# Patient Record
Sex: MALE | Race: WHITE | NOT HISPANIC OR LATINO
[De-identification: names, ages, dates, MRNs, and addresses within clinical notes are randomized per-mention and may not be internally consistent; named-entity substitution may affect disease eponyms.]

---

## 2023-08-17 ENCOUNTER — APPOINTMENT (OUTPATIENT)
Dept: ORTHOPEDIC SURGERY | Facility: CLINIC | Age: 23
End: 2023-08-17
Payer: COMMERCIAL

## 2023-08-17 VITALS — HEIGHT: 68 IN | BODY MASS INDEX: 18.94 KG/M2 | WEIGHT: 125 LBS

## 2023-08-17 DIAGNOSIS — M25.562 PAIN IN LEFT KNEE: ICD-10-CM

## 2023-08-17 PROBLEM — Z00.00 ENCOUNTER FOR PREVENTIVE HEALTH EXAMINATION: Status: ACTIVE | Noted: 2023-08-17

## 2023-08-17 PROCEDURE — 99203 OFFICE O/P NEW LOW 30 MIN: CPT

## 2023-08-17 PROCEDURE — 73562 X-RAY EXAM OF KNEE 3: CPT | Mod: LT

## 2023-08-17 NOTE — IMAGING
[de-identified] : On examination of the left knee no obvious swelling, no knee effusion, no ecchymosis, no erythema.  Skin is intact.  He has tenderness mainly along the medial and lateral patella facet.  No significant joint line tenderness, no tenderness along the medial or lateral collateral ligament.  No tenderness over the patella tendon or bursa tendon.  Mild tenderness noted along the anterior proximal tibia.  Calf is soft.  He felt stable to stress testing.  Ambulating well.  X-ray left knee in the office today no obvious fracture dislocation or bony abnormality.

## 2023-08-17 NOTE — ASSESSMENT
[FreeTextEntry1] : At this time they are going to try and get an official report for his left knee MRI for further review.  If they are unable to, we may have to put in a request for new left knee MRI.  He will continue with his conservative treatment including rest, icing, knee bracing, taping and home exercise.  Follow-up in our sports medicine department.

## 2023-08-17 NOTE — HISTORY OF PRESENT ILLNESS
[de-identified] : 23-year-old male comes in today with his mom for evaluation of his left knee pain that has been going on now since last September.  Patient states that he was in [place] he was exercising running, doing sprints and began to feel pain in the left knee.  He has seen several physicians in [place], was doing physical therapy hydrotherapy e-stim and still having pain.  He is using a knee brace.  He had an MRI in [place] in May, was told that he had plica syndrome and bone contusion possible stress fracture of the tibia.  [place] in today for further evaluation.

## 2023-09-05 ENCOUNTER — APPOINTMENT (OUTPATIENT)
Dept: ORTHOPEDIC SURGERY | Facility: CLINIC | Age: 23
End: 2023-09-05